# Patient Record
Sex: FEMALE | Race: WHITE | NOT HISPANIC OR LATINO | URBAN - METROPOLITAN AREA
[De-identification: names, ages, dates, MRNs, and addresses within clinical notes are randomized per-mention and may not be internally consistent; named-entity substitution may affect disease eponyms.]

---

## 2018-04-26 ENCOUNTER — OFFICE VISIT CONVERTED (OUTPATIENT)
Dept: OTHER | Facility: HOSPITAL | Age: 73
End: 2018-04-26
Attending: INTERNAL MEDICINE

## 2018-06-29 ENCOUNTER — OFFICE VISIT CONVERTED (OUTPATIENT)
Dept: OTHER | Facility: HOSPITAL | Age: 73
End: 2018-06-29
Attending: NURSE PRACTITIONER

## 2018-07-18 ENCOUNTER — OFFICE VISIT CONVERTED (OUTPATIENT)
Dept: OTHER | Facility: HOSPITAL | Age: 73
End: 2018-07-18
Attending: NURSE PRACTITIONER

## 2018-08-30 ENCOUNTER — OFFICE VISIT CONVERTED (OUTPATIENT)
Dept: OTHER | Facility: HOSPITAL | Age: 73
End: 2018-08-30
Attending: INTERNAL MEDICINE

## 2018-10-15 ENCOUNTER — OFFICE VISIT CONVERTED (OUTPATIENT)
Dept: OTHER | Facility: HOSPITAL | Age: 73
End: 2018-10-15
Attending: NURSE PRACTITIONER

## 2018-12-19 ENCOUNTER — OFFICE VISIT CONVERTED (OUTPATIENT)
Dept: OTHER | Facility: HOSPITAL | Age: 73
End: 2018-12-19
Attending: NURSE PRACTITIONER

## 2018-12-20 ENCOUNTER — CONVERSION ENCOUNTER (OUTPATIENT)
Dept: OTHER | Facility: HOSPITAL | Age: 73
End: 2018-12-20

## 2019-02-28 ENCOUNTER — CONVERSION ENCOUNTER (OUTPATIENT)
Dept: OTHER | Facility: HOSPITAL | Age: 74
End: 2019-02-28

## 2019-03-04 ENCOUNTER — OFFICE VISIT CONVERTED (OUTPATIENT)
Dept: OTHER | Facility: HOSPITAL | Age: 74
End: 2019-03-04
Attending: NURSE PRACTITIONER

## 2019-06-03 ENCOUNTER — OFFICE VISIT CONVERTED (OUTPATIENT)
Dept: OTHER | Facility: HOSPITAL | Age: 74
End: 2019-06-03
Attending: NURSE PRACTITIONER

## 2019-06-03 ENCOUNTER — CONVERSION ENCOUNTER (OUTPATIENT)
Dept: OTHER | Facility: HOSPITAL | Age: 74
End: 2019-06-03

## 2019-07-15 ENCOUNTER — OFFICE VISIT CONVERTED (OUTPATIENT)
Dept: OTHER | Facility: HOSPITAL | Age: 74
End: 2019-07-15
Attending: NURSE PRACTITIONER

## 2019-07-29 ENCOUNTER — OFFICE VISIT CONVERTED (OUTPATIENT)
Dept: OTHER | Facility: HOSPITAL | Age: 74
End: 2019-07-29
Attending: NURSE PRACTITIONER

## 2019-07-29 ENCOUNTER — CONVERSION ENCOUNTER (OUTPATIENT)
Dept: OTHER | Facility: HOSPITAL | Age: 74
End: 2019-07-29

## 2019-08-28 ENCOUNTER — CONVERSION ENCOUNTER (OUTPATIENT)
Dept: OTHER | Facility: HOSPITAL | Age: 74
End: 2019-08-28

## 2019-08-28 ENCOUNTER — OFFICE VISIT CONVERTED (OUTPATIENT)
Dept: OTHER | Facility: HOSPITAL | Age: 74
End: 2019-08-28
Attending: NURSE PRACTITIONER

## 2019-08-29 ENCOUNTER — CONVERSION ENCOUNTER (OUTPATIENT)
Dept: OTHER | Facility: HOSPITAL | Age: 74
End: 2019-08-29

## 2019-11-07 ENCOUNTER — OFFICE VISIT CONVERTED (OUTPATIENT)
Dept: OTHER | Facility: HOSPITAL | Age: 74
End: 2019-11-07
Attending: NURSE PRACTITIONER

## 2019-11-07 ENCOUNTER — CONVERSION ENCOUNTER (OUTPATIENT)
Dept: OTHER | Facility: HOSPITAL | Age: 74
End: 2019-11-07

## 2019-11-15 ENCOUNTER — OFFICE VISIT CONVERTED (OUTPATIENT)
Dept: OTHER | Facility: HOSPITAL | Age: 74
End: 2019-11-15
Attending: NURSE PRACTITIONER

## 2019-11-15 ENCOUNTER — CONVERSION ENCOUNTER (OUTPATIENT)
Dept: OTHER | Facility: HOSPITAL | Age: 74
End: 2019-11-15

## 2021-05-15 VITALS
HEIGHT: 62 IN | HEART RATE: 82 BPM | WEIGHT: 113 LBS | TEMPERATURE: 98.2 F | DIASTOLIC BLOOD PRESSURE: 70 MMHG | BODY MASS INDEX: 20.8 KG/M2 | RESPIRATION RATE: 18 BRPM | SYSTOLIC BLOOD PRESSURE: 132 MMHG | OXYGEN SATURATION: 98 %

## 2021-05-15 VITALS
DIASTOLIC BLOOD PRESSURE: 60 MMHG | HEART RATE: 70 BPM | HEIGHT: 62 IN | BODY MASS INDEX: 19.88 KG/M2 | OXYGEN SATURATION: 92 % | SYSTOLIC BLOOD PRESSURE: 148 MMHG | TEMPERATURE: 98.4 F | WEIGHT: 108 LBS | RESPIRATION RATE: 18 BRPM

## 2021-05-15 VITALS
TEMPERATURE: 97.6 F | OXYGEN SATURATION: 96 % | SYSTOLIC BLOOD PRESSURE: 148 MMHG | DIASTOLIC BLOOD PRESSURE: 60 MMHG | HEART RATE: 73 BPM | BODY MASS INDEX: 20.24 KG/M2 | WEIGHT: 110 LBS | HEIGHT: 62 IN | RESPIRATION RATE: 18 BRPM

## 2021-05-15 VITALS
BODY MASS INDEX: 19.51 KG/M2 | HEIGHT: 62 IN | OXYGEN SATURATION: 99 % | WEIGHT: 106 LBS | TEMPERATURE: 98 F | HEART RATE: 73 BPM | SYSTOLIC BLOOD PRESSURE: 130 MMHG | RESPIRATION RATE: 18 BRPM | DIASTOLIC BLOOD PRESSURE: 58 MMHG

## 2021-05-15 VITALS
SYSTOLIC BLOOD PRESSURE: 119 MMHG | TEMPERATURE: 97.8 F | BODY MASS INDEX: 19.51 KG/M2 | DIASTOLIC BLOOD PRESSURE: 63 MMHG | OXYGEN SATURATION: 97 % | HEART RATE: 63 BPM | WEIGHT: 106 LBS | HEIGHT: 62 IN

## 2021-05-15 VITALS
HEART RATE: 62 BPM | DIASTOLIC BLOOD PRESSURE: 62 MMHG | BODY MASS INDEX: 20.8 KG/M2 | HEIGHT: 62 IN | WEIGHT: 113 LBS | TEMPERATURE: 98.2 F | OXYGEN SATURATION: 96 % | SYSTOLIC BLOOD PRESSURE: 128 MMHG | RESPIRATION RATE: 18 BRPM

## 2021-05-15 VITALS
OXYGEN SATURATION: 100 % | RESPIRATION RATE: 18 BRPM | BODY MASS INDEX: 19.88 KG/M2 | SYSTOLIC BLOOD PRESSURE: 182 MMHG | HEART RATE: 72 BPM | HEIGHT: 62 IN | TEMPERATURE: 98.1 F | WEIGHT: 108 LBS | DIASTOLIC BLOOD PRESSURE: 66 MMHG

## 2021-05-15 VITALS
SYSTOLIC BLOOD PRESSURE: 142 MMHG | OXYGEN SATURATION: 98 % | HEIGHT: 62 IN | TEMPERATURE: 97.4 F | RESPIRATION RATE: 18 BRPM | HEART RATE: 64 BPM | BODY MASS INDEX: 20.8 KG/M2 | WEIGHT: 113 LBS | DIASTOLIC BLOOD PRESSURE: 60 MMHG

## 2021-05-15 VITALS — SYSTOLIC BLOOD PRESSURE: 150 MMHG | DIASTOLIC BLOOD PRESSURE: 80 MMHG

## 2021-05-16 VITALS
HEART RATE: 68 BPM | BODY MASS INDEX: 22.08 KG/M2 | DIASTOLIC BLOOD PRESSURE: 60 MMHG | WEIGHT: 120 LBS | RESPIRATION RATE: 18 BRPM | OXYGEN SATURATION: 97 % | HEIGHT: 62 IN | TEMPERATURE: 97.1 F | SYSTOLIC BLOOD PRESSURE: 138 MMHG

## 2021-05-16 VITALS
BODY MASS INDEX: 21.35 KG/M2 | OXYGEN SATURATION: 100 % | HEIGHT: 62 IN | DIASTOLIC BLOOD PRESSURE: 60 MMHG | HEART RATE: 79 BPM | WEIGHT: 116 LBS | TEMPERATURE: 98 F | SYSTOLIC BLOOD PRESSURE: 122 MMHG | RESPIRATION RATE: 18 BRPM

## 2021-05-16 VITALS
OXYGEN SATURATION: 96 % | HEIGHT: 62 IN | SYSTOLIC BLOOD PRESSURE: 102 MMHG | WEIGHT: 120 LBS | TEMPERATURE: 97.3 F | RESPIRATION RATE: 18 BRPM | DIASTOLIC BLOOD PRESSURE: 50 MMHG | BODY MASS INDEX: 22.08 KG/M2 | HEART RATE: 78 BPM

## 2021-05-28 VITALS
HEIGHT: 62 IN | SYSTOLIC BLOOD PRESSURE: 134 MMHG | WEIGHT: 114.07 LBS | DIASTOLIC BLOOD PRESSURE: 62 MMHG | TEMPERATURE: 97.5 F | SYSTOLIC BLOOD PRESSURE: 189 MMHG | OXYGEN SATURATION: 97 % | TEMPERATURE: 97.6 F | HEIGHT: 62 IN | SYSTOLIC BLOOD PRESSURE: 123 MMHG | OXYGEN SATURATION: 96 % | OXYGEN SATURATION: 97 % | DIASTOLIC BLOOD PRESSURE: 59 MMHG | BODY MASS INDEX: 20.99 KG/M2 | SYSTOLIC BLOOD PRESSURE: 111 MMHG | HEART RATE: 68 BPM | WEIGHT: 118.06 LBS | OXYGEN SATURATION: 97 % | HEART RATE: 66 BPM | WEIGHT: 107.3 LBS | DIASTOLIC BLOOD PRESSURE: 55 MMHG | DIASTOLIC BLOOD PRESSURE: 79 MMHG | HEIGHT: 62 IN | WEIGHT: 118.25 LBS | BODY MASS INDEX: 21.76 KG/M2 | HEART RATE: 66 BPM | SYSTOLIC BLOOD PRESSURE: 127 MMHG | BODY MASS INDEX: 21.73 KG/M2 | BODY MASS INDEX: 19.75 KG/M2 | DIASTOLIC BLOOD PRESSURE: 58 MMHG | RESPIRATION RATE: 18 BRPM | TEMPERATURE: 97.3 F | HEART RATE: 58 BPM | TEMPERATURE: 98 F | WEIGHT: 112.9 LBS | BODY MASS INDEX: 20.78 KG/M2 | HEIGHT: 62 IN | OXYGEN SATURATION: 98 % | HEART RATE: 65 BPM | RESPIRATION RATE: 18 BRPM | HEIGHT: 62 IN | TEMPERATURE: 97.6 F | RESPIRATION RATE: 14 BRPM

## 2021-05-28 NOTE — PROGRESS NOTES
Patient: HAL THOMPSON     Acct: [id number]     Report: #[id number]  UNIT #: [id number]     : 1945    Encounter Date:2018  PRIMARY CARE:   ***Signed***  --------------------------------------------------------------------------------------------------------------------  DATE: 18      Primary Care Provider      Primary Care Provider:  MILTON GREGORY Located within [facility]            Referring Physician      Referring Provider not in look:        Gabby Marshall            Chief Complaint      Follow up PHI/ Lung Nodule            Subjective      73 year-old white female  has been followed up in this clinic for iron     deficiency anemia and lung nodule.  Patient had been given iron infusion     however developed severe hypersensitivity reaction consisting of tingling of     the hands and feet and collapsing necessitating emergency room consultation.            Patient also has a 4.8 mm right upper lung nodule noted on the CT scan of the     chest done last 2017.            Patient gives a history of falling down and hurting her left rib cage.  Patient     thinks she has broken her ribs.            Past Med/Surg History            Past Med/Surg History:   Hypertension             No Diabetes Mellitus             Heart Disease             No Blood Clots             No Cancer             No Lung Disease             No Kidney Disease             No Other            Social History      Social History:  No Tobacco Use (1ppd for 40 years), No Alcohol Use, No     Recreational Drug use, No Other            Allergies      Coded Allergies:             CODEINE (Unverified  Adverse Reaction, Mild, ITCH, 18)            Medications      Medications    Last Reconciled on 18 15:54 by ROSALINDA VARGAS MD      Neb-Albuterol/Ipratropium (Ipratropium/Albuterol) 3 Ml Ampul.neb      3 ML INH Q6H Y for SHORTNESS OF BREATH, #120 NEB 0 Refills         Reported         18       Albuterol (Proair HFA*) 8.5 Gm Inh      2 PUFFS  INH RTQ4H, #1 INH 0 Refills         Reported         4/26/18       Ascorbic Acid (Vitamin C*) 500 Mg Tablet      500 MG PO BID, #60 TAB 0 Refills         Reported         4/26/18       Fe Ps Cmplx/B12/FA (Ferrex 150 Forte*) 1 Each Capsule      1 CAP PO BID, CAP         Reported         4/26/18       Ranitidine Hcl (Ranitidine*) 150 Mg Tablet      150 MG PO BID, #60 TAB 0 Refills         Reported         4/26/18       Magnesium Oxide (Magnesium Oxide) 250 Mg Tablet      250 MG PO QDAY, TAB         Reported         4/26/18       Ranitidine Hcl (Ranitidine*) 150 Mg Tablet      150 MG PO BID, #60 TAB 0 Refills         Reported         4/26/18       Lisinopril* (Lisinopril*) 10 Mg Tablet      10 MG PO QDAY, #30 TAB 0 Refills         Reported         4/26/18       Pantoprazole (Protonix*) 20 Mg Tablet.dr      40 MG PO QDAY, TAB         Reported         2/3/17       Nitroglycerin (Nitrostat*) 0.4 Mg Tablet      0.4 MG SL ASDIR, #25 TAB 1 Refill         Reported         2/3/17       Aspirin (Aspirin*) 81 Mg Tab.chew      81 MG PO QDAY, #30 TAB.CHEW 0 Refills         Reported         2/4/15       Budesonide/Formoterol Fumarate (Symbicort 160/4.5 Mcg) 10.2 Gm Inh      2 PUFF INH RTBID, #1 INH 0 Refills         Reported         2/4/15       Clopidogrel Bisulfate (Plavix) 75 Mg Tablet      75 MG PO HS, TAB         Reported         2/4/15       Metoprolol Tartrate (Metoprolol Tartrate*) 25 Mg Tablet      12.5 MG PO BID, #30 TAB 0 Refills         Reported         2/4/15       Alprazolam (Alprazolam) 1 Mg Tablet      0.5 MG PO TID, #90 TAB         Reported         2/4/15       Temazepam (Temazepam) 30 Mg Capsule      30 MG PO HS, CAP         Reported         2/4/15      Current Medications      Current Medications Reviewed 4/26/18            Pain Assessment      Pain Intensity:  6 (left side ribs)      Duration:  Continuous            Review of Systems      General:  No Anxiety, Fatigue Scale: (4), Pain Scale: (6), No Fever, No  Other      HEENT:  No Dysphagia, No Hearing Changes, No Rhinorrhea, No Tinnitus, No Visual     Changes, No Nasal Congestion, No Epistaxis, No Other      Respiratory:  No Cough, No Shortness of Air, No Sputum Changes, No Wheezing, No     Hemoptysis, No Congestion, No Other      Cardiovascular:  No Chest Pain, No Pedal Edema, No Orthopnea, No Palpitations,     No Chest Pressure, No Dizziness, No Other      Gastrointestinal:  No Nausea, No Vomiting, No Dysphagia, No Constipation, No     Diarrhea, No Appetite Good, No Appetite Fair, Appetite Poor, No Early Satiety,     No Other      Genitourinary:  No Nocturia, No Dysuria, No Other      Musculoskeletal:  No Joint Effusions, Joint Tenderness, Joint Stiffness, No     Myalgias, Aches, Pains, No Other      Endocrine:  No Heat Intolerance, No Cold Intolerance, No Fatigue, No Blood     Sugar Control, No Other      Hematologic:  Bleeding, Bruising, No Swollen Glands, No Other      Allergic/Immunologic:  No Hives, No Throat closing off, No Nasal drip, No Itchy     eyes, No Hay fever, No Other      Psychological:  Anxiety, No Depression, No Other      Neurological:  No Headaches, No Dizziness, No Weakness, No Numbness, No Other      Skin:  No Rash, No Open Wounds, No Edema, No Other            Vitals      Height 5 ft 2 in / 157.48 cm      Weight 118 lbs 4 oz / 53.823362 kg      BSA 1.53 m2      BMI 21.6 kg/m2      Temperature 97.6 F / 36.44 C - Temporal      Pulse 58      Blood Pressure 123/55 Sitting, Left Arm      Pulse Oximetry 96%, room air            Exam      Constitutional:  No acute distress, Conversant, Pleasant, No Weakness      Eyes:  Anicteric sclerae, Palpebral Conjunctivae, CHERYL      HENT:  Oropharynx clear, No Erythema, Buccal mucosae (pink)      Neck:  Supple, Full Range of Motion      Cardiovascular:  RRR, No Murmurs, Normal PMI, No Peripheral Edema      Lungs:  Clear to Ausculation, Normal Respiratory Effort      Abdomen:  Soft, NABS, No Tenderness      Chest:   Palpatation (tenderness on palpation of the left rib cage)      Lymphatic:  No Neck      Extremities:  No digital cyanosis, Normal gait, Other (no deformity, limitation     in range of motion)            Lab Results            Laboratory Tests            Test        1/17/18      14:55 1/19/18      22:00             Clostridium difficile      [id number] NEGATIVE                      Clostridium difficile Toxin      (PCR) NEGATIVE                      Lab Scanned Report                LAB FINAL      CUMULATIVES -            On 04/26/2018 patient's white count was 7.4, hemoglobin 11.6, hematocrit 36.1,     normochromic, normocytic indices within normal platelet count.            Impression/Problem List      Iron deficiency anemia      Right upper lobe lung nodule      Hypertension      Hyperlipidemia      Coronary artery disease      Hypersensitivity reaction to intravenous iron      Notes      New Medications      * Magnesium Oxide 250 MG TABLET: 250 MG PO QDAY      * Ranitidine Hcl (Ranitidine*) 150 MG TABLET: 150 MG PO BID #60      * Fe Ps Cmplx/B12/FA (Ferrex 150 Forte*) 1 EACH CAPSULE: 1 CAP PO BID      * ASCORBIC ACID (Vitamin C*) 500 MG TABLET: 500 MG PO BID #60      * ALBUTEROL (Proair HFA*) 8.5 GM INH: 2 PUFFS INH RTQ4H #1      * NEB-ALBUTEROL/IPRATROPIUM (Ipratropium/Albuterol) 3 ML AMPUL.NEB: 3 ML INH     Q6H PRN SHORTNESS OF BREATH #120       Instructions: DIAGNOSIS CODE REQUIRED PRIOR TO PRESCRIBING.      Changed Medications      * Lisinopril* 10 MG TABLET: 10 MG PO QDAY #30       Replaced 5 MG TABLET: 5 MG PO QDAY #30      * Ranitidine Hcl (Ranitidine*) 150 MG TABLET: 150 MG PO BID #60       Replaced Ranitidine HCl (Ranitidine*) 300 MG TABLET:       300 MG PO QDAY #30            Plan      Continue iron supplements.      Repeat CAT scan of the chest in June      In 4 months CBC, reticulocyte count, iron profile, ferritin level.            Patient Education:        Iron-Deficiency Anemia             PREVENTION      Hx Influenza Vaccination:  Yes (2017)      Date Influenza Vaccine Given:  Nov 13, 2017      Influenza Vaccine Declined:  No      2 or More Falls Past Year?:  No      Fall Past Year with Injury?:  No      Hx Pneumococcal Vaccination:  Yes (2017)      Encouraged to follow-up with:  PCP regarding preventative exams.      Chart initiated by      Anna Sandoval MA                 Disclaimer: Converted document may not contain table formatting or lab diagrams. Please see [employer] for the authenticated document.

## 2021-05-28 NOTE — PROGRESS NOTES
Patient: HAL THOMPSON     Acct: [id number]     Report: #[id number]  UNIT #: [id number]     : 1945    Encounter Date:2018  PRIMARY CARE:   ***Signed***  --------------------------------------------------------------------------------------------------------------------  DATE: 18      Primary Care Provider      Primary Care Provider:  MILTON GREGORY [facility]            Referring Physician      Referring Provider not in look:        Gabby Marshall            Chief Complaint      Follow up PHI/ Lung nodule            Subjective      Pt states she has a sore breast and a lump located close to her knee.             73-year-old white female has a history on deficiency anemia probably secondary     to her antiplatelets which was imperative for coronary artery disease.  Patient     unfortunately had a hypersensitivity to a reaction to iron IV  and refused     further IV iron administration.            Patient complains of pain/tenderness under her left breast.  She also noted a     small lump lateral to the her left knee.            Past Med/Surg History            Past Med/Surg History:   Hypertension             No Diabetes Mellitus             Heart Disease             No Blood Clots             No Cancer             No Lung Disease             No Kidney Disease             No Other            Social History      Social History:  No Tobacco Use (1ppd for 40 years), No Alcohol Use, No     Recreational Drug use, No Other            Allergies      Coded Allergies:             CODEINE (Unverified  Adverse Reaction, Mild, ITCH, 18)            Medications      Medications    Last Reconciled on 18 15:54 by ROSALINDA VARGAS MD      Indomethacin (Indocin) 50 Mg Capsule      50 MG PO TID for 3 Days, #9 CAP         Prov: Veza,Sherita         18       Ascorbic Acid (Vitamin C*) 500 Mg Tablet      500 MG PO BID for 90 Days, #180 TAB 3 Refills         Prov: Veza,Sherita         18       Fe Ps  Cmplx/B12/FA (Ferrex 150 Forte*) 1 Each Capsule      1 CAP PO BID for 90 Days, #180 CAP 3 Refills         Prov: VealexandriaAmboy         6/4/18       Neb-Albuterol/Ipratropium (Ipratropium/Albuterol) 3 Ml Ampul.neb      3 ML INH Q6H PRN for SHORTNESS OF BREATH, #120 NEB 0 Refills         Reported         4/26/18       Albuterol (Proair HFA*) 8.5 Gm Inh      2 PUFFS INH RTQ4H, #1 INH 0 Refills         Reported         4/26/18       Ranitidine Hcl (Ranitidine*) 150 Mg Tablet      150 MG PO BID, #60 TAB 0 Refills         Reported         4/26/18       Magnesium Oxide (Magnesium Oxide) 250 Mg Tablet      250 MG PO QDAY, TAB         Reported         4/26/18       Ranitidine Hcl (Ranitidine*) 150 Mg Tablet      150 MG PO BID, #60 TAB 0 Refills         Reported         4/26/18       Lisinopril* (Lisinopril*) 10 Mg Tablet      10 MG PO QDAY, #30 TAB 0 Refills         Reported         4/26/18       Pantoprazole (Protonix*) 20 Mg Tablet.dr      40 MG PO QDAY, TAB         Reported         2/3/17       Nitroglycerin (Nitrostat*) 0.4 Mg Tablet      0.4 MG SL ASDIR, #25 TAB 1 Refill         Reported         2/3/17       Aspirin (Aspirin*) 81 Mg Tab.chew      81 MG PO QDAY, #30 TAB.CHEW 0 Refills         Reported         2/4/15       Budesonide/Formoterol Fumarate (Symbicort 160/4.5 Mcg) 10.2 Gm Inh      2 PUFF INH RTBID, #1 INH 0 Refills         Reported         2/4/15       Clopidogrel Bisulfate (Plavix) 75 Mg Tablet      75 MG PO HS, TAB         Reported         2/4/15       Metoprolol Tartrate (Metoprolol Tartrate*) 25 Mg Tablet      12.5 MG PO BID, #30 TAB 0 Refills         Reported         2/4/15       Alprazolam (Alprazolam) 1 Mg Tablet      0.5 MG PO TID, #90 TAB         Reported         2/4/15       Temazepam (Temazepam) 30 Mg Capsule      30 MG PO HS, CAP         Reported         2/4/15      Current Medications      Current Medications Reviewed 8/30/18            Pain Assessment      Description:  None            Review of  Systems      General:  No Anxiety; Fatigue Scale: (8); No Pain Scale:, No Fever, No Other      HEENT:  No Dysphagia, No Hearing Changes, No Rhinorrhea, No Tinnitus, No Visual     Changes, No Nasal Congestion, No Epistaxis, No Other      Respiratory:  No Cough; Shortness of Air; No Sputum Changes, No Wheezing, No     Hemoptysis, No Congestion, No Other      Cardiovascular:  Chest Pain; No Pedal Edema, No Orthopnea, No Palpitations;     Chest Pressure; No Dizziness, No Other      Gastrointestinal:  No Nausea, No Vomiting, No Dysphagia, No Constipation, No     Diarrhea, No Appetite Good; Appetite Fair; No Appetite Poor, No Early Satiety,     No Other      Genitourinary:  No Nocturia, No Dysuria, No Other      Musculoskeletal:  No Joint Effusions; Joint Tenderness, Joint Stiffness; No     Myalgias, No Aches, No Pains, No Other      Endocrine:  No Heat Intolerance, No Cold Intolerance, No Fatigue, No Blood Sugar    Control, No Other      Hematologic:  No Bleeding, No Bruising, No Swollen Glands, No Other      Allergic/Immunologic:  No Hives, No Throat closing off, No Nasal drip, No Itchy     eyes, No Hay fever, No Other      Psychological:  Anxiety; No Depression, No Other      Neurological:  No Headaches, No Dizziness, No Weakness, No Numbness, No Other      Skin:  No Rash, No Open Wounds, No Edema, No Other            Vitals      Height 5 ft 2 in / 157.48 cm      Weight 118 lbs 1 oz / 53.832719 kg      BSA 1.53 m2      BMI 21.6 kg/m2      Temperature 98.0 F / 36.67 C - Oral      Pulse 66      Blood Pressure 111/58 Sitting, Left Arm      Pulse Oximetry 97%, room air            Exam      Constitutional:  No acute distress, Conversant, Pleasant      Eyes:  Anicteric sclerae, Palpebral Conjunctivae (pink), CHERYL      HENT:  Oropharynx clear; No Erythema; Buccal mucosae (pink)      Neck:  Supple, Full Range of Motion      Cardiovascular:  RRR; No Murmurs; Normal PMI; No Peripheral Edema      Lungs:  Clear to Ausculation,  Normal Respiratory Effort, Other (short of air)      Abdomen:  Soft, NABS; No Tenderness      Chest:  Other (symmetrical and equal)      Breast:  No Masses; Tenderness (tenderness.  The left nipple, no masses no     thickness noted); No Drainage      Lymphatic:  No Neck, No Axillae      Extremities:  No digital cyanosis, No digital ischemia, Normal gait, Other (no     deformity no mutation range of motion)      Neurological:  Cranial Nerve II-XII (Intact); No Focal Sensory deficits      Psychological:  Appropriate affect, Appropriate mood, Intact judgement, Alert      Skin:  Other (no dermatosis)            Lab Results      Ferritin 10.9, hemoglobin 11.3, hematocrit 34.9, normal white count, normal     platelet count.            Impression/Problem List      Iron deficiency anemia secondary to antiplatelet indications given for her     coronary artery disease      Right upper lobe lung nodule not found on present CAT scan of the chest      Hypertension      Hyperlipidemia      Coronary artery disease      Hypersensitivity reaction to intravenous iron      Probable cysts on the lateral left knee      Probable inflammation left breast      Notes      New Medications      * Indomethacin (Indocin) 50 MG CAPSULE: 50 MG PO TID 3 Days #9         Instructions: 1 CAP            Plan      Continue iron supplements.      Repeat CAT scan of the chest in June done no nodule noted      In 4 months CBC, reticulocyte count, iron profile, ferritin level.      Trial of Indocin for breast tenderness.  Patient was total possibility of GI     bleed and increased bleeding.  May hold aspirin while on Indocin.      Observe probable cyst, lateral left knee.            Patient Education:        Iron-Deficiency Anemia            PREVENTION      Hx Influenza Vaccination:  Yes (2017)      Date Influenza Vaccine Given:  Nov 13, 2017      Influenza Vaccine Declined:  No      2 or More Falls Past Year?:  No      Fall Past Year with Injury?:  No       Hx Pneumococcal Vaccination:  Yes (2017)      Encouraged to follow-up with:  PCP regarding preventative exams.      Chart initiated by      Anna Sandoval MA                 Disclaimer: Converted document may not contain table formatting or lab diagrams. Please see [employer] for the authenticated document.